# Patient Record
Sex: MALE | Race: OTHER | Employment: UNEMPLOYED | URBAN - METROPOLITAN AREA
[De-identification: names, ages, dates, MRNs, and addresses within clinical notes are randomized per-mention and may not be internally consistent; named-entity substitution may affect disease eponyms.]

---

## 2024-05-20 ENCOUNTER — HOSPITAL ENCOUNTER (EMERGENCY)
Age: 34
Discharge: HOME | End: 2024-05-20
Payer: MEDICAID

## 2024-05-20 ENCOUNTER — APPOINTMENT (OUTPATIENT)
Dept: GENERAL RADIOLOGY | Age: 34
End: 2024-05-20
Payer: MEDICAID

## 2024-05-20 ENCOUNTER — APPOINTMENT (OUTPATIENT)
Dept: ULTRASOUND IMAGING | Age: 34
End: 2024-05-20
Payer: MEDICAID

## 2024-05-20 ENCOUNTER — APPOINTMENT (OUTPATIENT)
Dept: CT IMAGING | Age: 34
End: 2024-05-20
Payer: MEDICAID

## 2024-05-20 VITALS
TEMPERATURE: 98.6 F | RESPIRATION RATE: 17 BRPM | DIASTOLIC BLOOD PRESSURE: 107 MMHG | SYSTOLIC BLOOD PRESSURE: 170 MMHG | HEART RATE: 102 BPM | OXYGEN SATURATION: 100 %

## 2024-05-20 DIAGNOSIS — I10 UNCONTROLLED HYPERTENSION: ICD-10-CM

## 2024-05-20 DIAGNOSIS — I82.452 ACUTE DEEP VEIN THROMBOSIS (DVT) OF LEFT PERONEAL VEIN (HCC): Primary | ICD-10-CM

## 2024-05-20 LAB
ALBUMIN SERPL-MCNC: 4.7 GM/DL (ref 3.4–5)
ALP BLD-CCNC: 82 IU/L (ref 40–128)
ALT SERPL-CCNC: 23 U/L (ref 10–40)
ANION GAP SERPL CALCULATED.3IONS-SCNC: 8 MMOL/L (ref 7–16)
APTT: 29 SECONDS (ref 25.1–37.1)
AST SERPL-CCNC: 19 IU/L (ref 15–37)
BASOPHILS ABSOLUTE: 0 K/CU MM
BASOPHILS RELATIVE PERCENT: 0.8 % (ref 0–1)
BILIRUB SERPL-MCNC: 0.6 MG/DL (ref 0–1)
BILIRUBIN, URINE: NEGATIVE MG/DL
BLOOD, URINE: NEGATIVE
BUN SERPL-MCNC: 15 MG/DL (ref 6–23)
CALCIUM SERPL-MCNC: 9.2 MG/DL (ref 8.3–10.6)
CHLORIDE BLD-SCNC: 102 MMOL/L (ref 99–110)
CLARITY: CLEAR
CO2: 27 MMOL/L (ref 21–32)
COLOR: YELLOW
COMMENT UA: NORMAL
CREAT SERPL-MCNC: 0.9 MG/DL (ref 0.9–1.3)
DIFFERENTIAL TYPE: ABNORMAL
EOSINOPHILS ABSOLUTE: 0.2 K/CU MM
EOSINOPHILS RELATIVE PERCENT: 3.8 % (ref 0–3)
GFR, ESTIMATED: >90 ML/MIN/1.73M2
GLUCOSE SERPL-MCNC: 88 MG/DL (ref 70–99)
GLUCOSE URINE: NEGATIVE MG/DL
HCT VFR BLD CALC: 44.5 % (ref 42–52)
HEMOGLOBIN: 14.6 GM/DL (ref 13.5–18)
IMMATURE NEUTROPHIL %: 0.2 % (ref 0–0.43)
INR BLD: 0.9 INDEX
KETONES, URINE: NEGATIVE MG/DL
LEUKOCYTE ESTERASE, URINE: NEGATIVE
LYMPHOCYTES ABSOLUTE: 2 K/CU MM
LYMPHOCYTES RELATIVE PERCENT: 40 % (ref 24–44)
MCH RBC QN AUTO: 29.3 PG (ref 27–31)
MCHC RBC AUTO-ENTMCNC: 32.8 % (ref 32–36)
MCV RBC AUTO: 89.4 FL (ref 78–100)
MONOCYTES ABSOLUTE: 0.3 K/CU MM
MONOCYTES RELATIVE PERCENT: 6.7 % (ref 0–4)
NEUTROPHILS ABSOLUTE: 2.5 K/CU MM
NEUTROPHILS RELATIVE PERCENT: 48.5 % (ref 36–66)
NITRITE URINE, QUANTITATIVE: NEGATIVE
NUCLEATED RBC %: 0 %
PDW BLD-RTO: 12.5 % (ref 11.7–14.9)
PH, URINE: 5.5 (ref 5–8)
PLATELET # BLD: 230 K/CU MM (ref 140–440)
PMV BLD AUTO: 9.8 FL (ref 7.5–11.1)
POTASSIUM SERPL-SCNC: 4.5 MMOL/L (ref 3.5–5.1)
PROTEIN UA: NEGATIVE MG/DL
PROTHROMBIN TIME: 12.8 SECONDS (ref 11.7–14.5)
RBC # BLD: 4.98 M/CU MM (ref 4.6–6.2)
SODIUM BLD-SCNC: 137 MMOL/L (ref 135–145)
SPECIFIC GRAVITY UA: 1.02 (ref 1–1.03)
TOTAL IMMATURE NEUTOROPHIL: 0.01 K/CU MM
TOTAL NUCLEATED RBC: 0 K/CU MM
TOTAL PROTEIN: 8.3 GM/DL (ref 6.4–8.2)
TROPONIN, HIGH SENSITIVITY: 13 NG/L (ref 0–22)
TROPONIN, HIGH SENSITIVITY: 13 NG/L (ref 0–22)
UROBILINOGEN, URINE: 0.2 MG/DL (ref 0.2–1)
WBC # BLD: 5.1 K/CU MM (ref 4–10.5)

## 2024-05-20 PROCEDURE — 93971 EXTREMITY STUDY: CPT

## 2024-05-20 PROCEDURE — 80053 COMPREHEN METABOLIC PANEL: CPT

## 2024-05-20 PROCEDURE — 70450 CT HEAD/BRAIN W/O DYE: CPT

## 2024-05-20 PROCEDURE — 84484 ASSAY OF TROPONIN QUANT: CPT

## 2024-05-20 PROCEDURE — 85025 COMPLETE CBC W/AUTO DIFF WBC: CPT

## 2024-05-20 PROCEDURE — 85730 THROMBOPLASTIN TIME PARTIAL: CPT

## 2024-05-20 PROCEDURE — 85610 PROTHROMBIN TIME: CPT

## 2024-05-20 PROCEDURE — 81003 URINALYSIS AUTO W/O SCOPE: CPT

## 2024-05-20 PROCEDURE — 93005 ELECTROCARDIOGRAM TRACING: CPT | Performed by: PHYSICIAN ASSISTANT

## 2024-05-20 PROCEDURE — 99285 EMERGENCY DEPT VISIT HI MDM: CPT

## 2024-05-20 PROCEDURE — 71045 X-RAY EXAM CHEST 1 VIEW: CPT

## 2024-05-20 RX ORDER — AMLODIPINE BESYLATE 5 MG/1
5 TABLET ORAL DAILY
Qty: 30 TABLET | Refills: 0 | Status: SHIPPED | OUTPATIENT
Start: 2024-05-20

## 2024-05-20 NOTE — PROGRESS NOTES
Outpatient Pharmacy Progress Note for Meds-to-Beds    Total number of Prescriptions Filled: 2    Additional Documentation:  Medication(s) were delivered to the patient's room prior to discharge      Thank you for letting us serve your patients.  [address] [address]    Phone: [phone]    Fax: [phone]

## 2024-05-20 NOTE — DISCHARGE INSTRUCTIONS
1. Call to schedule follow up hospital follow up appointment:   [facility] at [facility] [phone]   [address] [facility] Building Room 30     [facility] [phone]   [address] [facility], [address]     [facility] [phone]   [address] [facility]     2. Call for new patient Primary Care Physician appointment:   Physician Finder [phone]     Dr. Kang and Dr. Wright [phone]   [address] [place], [place]     Kary Reich [phone]   [address] [place], [place]     Dr. Golden and Abeba Zamora [phone]   [address] [facility] [place], [place]     Dr. Lyon and Shu Murdock [phone]   [address] [place], [place]     Jimena Robles [phone]   [address] [place], [place]     Brook Matthews CNP - [facility] [phone] [address] [place], [place] *Private Pay ONLY - Membership Program*     Dr. Boles, Josef Rashid PA and Chaz Valadez [phone]   [address] [facility], [address] [facility] (*Active Waiting List*) [phone]   [address] [place], [place]     Dr. Guzman [phone]   [address] [place], [place]     Flaco Bansal [phone]   [address] [place], [place]   -------------------------------------------------------------------  [facility]  Kenyatta Fofana  2-1-1:   marie Goldstein  [phone] mesha 2-1-1  [email or web address]    Miles Landaverde:  sam lynn, neil waddell, [facility]  [phone]    [facility]:   connor rojas rad ak mèb  [phone] or [phone]    Mary campos:   connor rojas rad ak mèb  [phone]    [facility] [address]:     [address]

## 2024-05-20 NOTE — ED NOTES
Patient presenting to the ED for a \"Check up\". Patient states he had a stroke back in [place] and that he needs physical therapy now to get his left leg working again. Patient also requesting to get his medications filled and to see a cardiologist. Patient states it was too hot for him to sleep last night that he needs a check up.

## 2024-05-20 NOTE — CARE COORDINATION
CM review of pt chart for discharge needs. Pt is Barb[place] creole vernacular. [place] Community [employer] community program placed in pt AVS.

## 2024-05-20 NOTE — ED PROVIDER NOTES
no edema, asymmetry, or calf / thigh tenderness bilaterally.   No cyanosis.    No cool or pale-appearing limb.  Distal cap refill and pulses intact bilateral upper and lower extremities  Bilateral upper and lower extremity ROM intact without pain or obvious deficit  Integument:   Warm, Dry  Neurologic:  Alert & oriented , No focal deficits noted.   Cranial nerves II through XII grossly intact.   Normal gross motor coordination & motor strength bilateral upper and lower extremities  Sensation intact.  Psychiatric:  Affect normal, Mood normal.     DIAGNOSTIC RESULTS   LABS:    Labs Reviewed   CBC WITH AUTO DIFFERENTIAL - Abnormal; Notable for the following components:       Result Value    Monocytes % 6.7 (*)     Eosinophils % 3.8 (*)     All other components within normal limits   COMPREHENSIVE METABOLIC PANEL - Abnormal; Notable for the following components:    Total Protein 8.3 (*)     All other components within normal limits   TROPONIN   TROPONIN   PROTIME/INR & PTT   URINALYSIS       When ordered, only abnormal lab results are displayed. All other labs were within normal range or not returned as of this dictation.    EKG: When ordered, EKG's are interpreted by the Emergency Department Physician in the absence of a cardiologist.  Please see their note for interpretation of EKG.    RADIOLOGY:   Non-plain film images such as CT, Ultrasound and MRI are read by the radiologist. Plain radiographic images are visualized and preliminarily interpreted by the  ED Provider with the below findings:    Interpretation perthe Radiologist below, if available at the time of this note:    Vascular duplex lower extremity venous left   Final Result   1. Evidence of infrapopliteal deep vein thrombosis isolated to the peroneal vein      Electronically signed by Joe Easton MD      CT HEAD WO CONTRAST   Final Result   1. Normal brain      Electronically signed by Joe Easton MD      XR CHEST PORTABLE   Final Result   1. No

## 2024-05-22 LAB
EKG ATRIAL RATE: 71 BPM
EKG DIAGNOSIS: NORMAL
EKG P AXIS: 41 DEGREES
EKG P-R INTERVAL: 146 MS
EKG Q-T INTERVAL: 382 MS
EKG QRS DURATION: 88 MS
EKG QTC CALCULATION (BAZETT): 415 MS
EKG R AXIS: 51 DEGREES
EKG T AXIS: 45 DEGREES
EKG VENTRICULAR RATE: 71 BPM

## 2024-05-22 PROCEDURE — 93010 ELECTROCARDIOGRAM REPORT: CPT | Performed by: INTERNAL MEDICINE

## 2024-07-02 ENCOUNTER — HOSPITAL ENCOUNTER (EMERGENCY)
Age: 34
Discharge: HOME | End: 2024-07-02
Payer: MEDICAID

## 2024-07-02 VITALS
SYSTOLIC BLOOD PRESSURE: 163 MMHG | OXYGEN SATURATION: 100 % | HEART RATE: 86 BPM | DIASTOLIC BLOOD PRESSURE: 111 MMHG | RESPIRATION RATE: 15 BRPM | TEMPERATURE: 97.5 F

## 2024-07-02 DIAGNOSIS — I10 HYPERTENSION, UNSPECIFIED TYPE: ICD-10-CM

## 2024-07-02 DIAGNOSIS — I82.402 ACUTE DEEP VEIN THROMBOSIS (DVT) OF LEFT LOWER EXTREMITY, UNSPECIFIED VEIN (HCC): Primary | ICD-10-CM

## 2024-07-02 PROCEDURE — 99283 EMERGENCY DEPT VISIT LOW MDM: CPT

## 2024-07-02 RX ORDER — AMLODIPINE BESYLATE 5 MG/1
5 TABLET ORAL DAILY
Qty: 30 TABLET | Refills: 0 | Status: SHIPPED | OUTPATIENT
Start: 2024-07-02 | End: 2024-08-01

## 2024-07-02 ASSESSMENT — PAIN - FUNCTIONAL ASSESSMENT: PAIN_FUNCTIONAL_ASSESSMENT: 0-10

## 2024-07-02 ASSESSMENT — ENCOUNTER SYMPTOMS
ABDOMINAL PAIN: 0
SHORTNESS OF BREATH: 0

## 2024-07-02 ASSESSMENT — PAIN SCALES - GENERAL: PAINLEVEL_OUTOF10: 0

## 2024-07-02 NOTE — ED TRIAGE NOTES
Patient states he is here for being out of his BP medication. Patient was here a month ago for HTN and DVT. Patient is out of the eliquis.

## 2024-07-02 NOTE — ED PROVIDER NOTES
incidental findings. Discharged with instructions to obtain outpatient follow up of patient's symptoms and findings, with strict return precautions if patient develops new or worsening symptoms.  Follow-up plan and return precautions were provided and discussed in detail patient in agreement.    I am the Primary Clinician of Record.    This patient was evaluated, managed, and treated in the context of the COVID-19 pandemic. As such, associated resources were utilized in his care. I did don/doff appropriate PPE (including N95 mask, safety glasses, gown, gloves), as recommended by the health facility/national standard best practice, during my bedside interactions with the patient.      The patient tolerated their visit well.  I evaluated the patient.  The physician was available for consultation as needed.  The patient and / or the family were informed of the results of any tests, a time was given to answer questions, a plan was proposed and they agreed with plan.       CLINICAL IMPRESSION:  1. Acute deep vein thrombosis (DVT) of left lower extremity, unspecified vein (HCC)    2. Hypertension, unspecified type        Is this patient to be included in the SEP-1 Core Measure due to severe sepsis or septic shock?   No   Exclusion criteria - the patient is NOT to be included for SEP-1 Core Measure due to:  2+ SIRS criteria are not met    DISPOSITION Decision To Discharge 07/02/2024 09:20:47 AM      PATIENT REFERRED TO:  Vishal Roth MD  [address] [facility]  [address]  [facility] [address]  [phone]    On 7/16/2024  For re-evaluation of your blood pressure and your DVT (blood clot)    [facility] Physician Finder and Scheduling  [phone]  Call       Ernie Kang MD  [address] [place] [id number]  [phone]            DISCHARGE MEDICATIONS:  Discharge Medication List as of 7/2/2024  9:34 AM          DISCONTINUED MEDICATIONS:  Discharge Medication List as of 7/2/2024  9:34 AM                 (Please

## 2024-07-02 NOTE — DISCHARGE INSTRUCTIONS
1) Keep your appointment with the cardiologist office for re-evaluation of your blood clot and your blood pressure.    2) In addition, please contact a primary care provider to get established for ongoing health care needs.    3) You can call the [facility] Dept. To be seen there. Creole: [phone] (Martina lilia marroquin ke 4 mwa nan peyi a)    Return with any chest pain, shortness of breath, weakness, confusion, worsening symptoms or any new concerns.           Refugee Health Testing  For people that have been in the country less than 4 months  Martina moun ki gen mwens ke 4 mwa nan peyi a  Para personas que conner estado en el país menos de 4 meses    [facility] Department  [address] [place] [address]    By appointment only  Call [phone]  for more information    Creole: [phone]    Eva rosanna Fish [phone]  Martina plis Enfômasyon    Solo con renetta  Llame al [phone]   Para más información    Health Assessments  Evalyasyon Sante  Evaluaciones de Marcella    Hearing and Vision Checks  Kontwole aaliyah asif  Chequeos de Audición y Visión    Immunizations  Candi Alvarez      [facility]